# Patient Record
Sex: MALE | Race: BLACK OR AFRICAN AMERICAN | NOT HISPANIC OR LATINO | Employment: FULL TIME | URBAN - METROPOLITAN AREA
[De-identification: names, ages, dates, MRNs, and addresses within clinical notes are randomized per-mention and may not be internally consistent; named-entity substitution may affect disease eponyms.]

---

## 2019-08-27 ENCOUNTER — HOSPITAL ENCOUNTER (EMERGENCY)
Facility: HOSPITAL | Age: 34
Discharge: HOME | End: 2019-08-27
Attending: EMERGENCY MEDICINE

## 2019-08-27 VITALS
WEIGHT: 210 LBS | BODY MASS INDEX: 28.44 KG/M2 | HEIGHT: 72 IN | HEART RATE: 74 BPM | TEMPERATURE: 98 F | OXYGEN SATURATION: 99 % | DIASTOLIC BLOOD PRESSURE: 72 MMHG | SYSTOLIC BLOOD PRESSURE: 122 MMHG | RESPIRATION RATE: 16 BRPM

## 2019-08-27 DIAGNOSIS — M62.82 NON-TRAUMATIC RHABDOMYOLYSIS: Primary | ICD-10-CM

## 2019-08-27 DIAGNOSIS — R53.83 FATIGUE: ICD-10-CM

## 2019-08-27 DIAGNOSIS — R19.7 DIARRHEA, UNSPECIFIED TYPE: ICD-10-CM

## 2019-08-27 DIAGNOSIS — T67.9XXA HEAT EXPOSURE, INITIAL ENCOUNTER: ICD-10-CM

## 2019-08-27 LAB
ALBUMIN SERPL BCP-MCNC: 4.8 G/DL (ref 3.5–5.2)
ALP SERPL-CCNC: 85 U/L (ref 55–135)
ALT SERPL W/O P-5'-P-CCNC: 17 U/L (ref 10–44)
ANION GAP SERPL CALC-SCNC: 11 MMOL/L (ref 8–16)
AST SERPL-CCNC: 27 U/L (ref 10–40)
BASOPHILS # BLD AUTO: 0.03 K/UL (ref 0–0.2)
BASOPHILS NFR BLD: 0.4 % (ref 0–1.9)
BILIRUB SERPL-MCNC: 0.5 MG/DL (ref 0.1–1)
BUN SERPL-MCNC: 13 MG/DL (ref 6–20)
CALCIUM SERPL-MCNC: 10.1 MG/DL (ref 8.7–10.5)
CHLORIDE SERPL-SCNC: 104 MMOL/L (ref 95–110)
CK SERPL-CCNC: 434 U/L (ref 20–200)
CO2 SERPL-SCNC: 21 MMOL/L (ref 23–29)
CREAT SERPL-MCNC: 1.1 MG/DL (ref 0.5–1.4)
DIFFERENTIAL METHOD: ABNORMAL
EOSINOPHIL # BLD AUTO: 0 K/UL (ref 0–0.5)
EOSINOPHIL NFR BLD: 0.5 % (ref 0–8)
ERYTHROCYTE [DISTWIDTH] IN BLOOD BY AUTOMATED COUNT: 14.1 % (ref 11.5–14.5)
EST. GFR  (AFRICAN AMERICAN): >60 ML/MIN/1.73 M^2
EST. GFR  (NON AFRICAN AMERICAN): >60 ML/MIN/1.73 M^2
GLUCOSE SERPL-MCNC: 93 MG/DL (ref 70–110)
HCT VFR BLD AUTO: 41 % (ref 40–54)
HGB BLD-MCNC: 13.8 G/DL (ref 14–18)
IMM GRANULOCYTES # BLD AUTO: 0.02 K/UL (ref 0–0.04)
IMM GRANULOCYTES NFR BLD AUTO: 0.2 % (ref 0–0.5)
LYMPHOCYTES # BLD AUTO: 1.5 K/UL (ref 1–4.8)
LYMPHOCYTES NFR BLD: 18.1 % (ref 18–48)
MCH RBC QN AUTO: 27.2 PG (ref 27–31)
MCHC RBC AUTO-ENTMCNC: 33.7 G/DL (ref 32–36)
MCV RBC AUTO: 81 FL (ref 82–98)
MONOCYTES # BLD AUTO: 0.4 K/UL (ref 0.3–1)
MONOCYTES NFR BLD: 4.3 % (ref 4–15)
NEUTROPHILS # BLD AUTO: 6.5 K/UL (ref 1.8–7.7)
NEUTROPHILS NFR BLD: 76.5 % (ref 38–73)
NRBC BLD-RTO: 0 /100 WBC
PLATELET # BLD AUTO: 250 K/UL (ref 150–350)
PMV BLD AUTO: 9.7 FL (ref 9.2–12.9)
POTASSIUM SERPL-SCNC: 3.9 MMOL/L (ref 3.5–5.1)
PROT SERPL-MCNC: 8 G/DL (ref 6–8.4)
RBC # BLD AUTO: 5.08 M/UL (ref 4.6–6.2)
SODIUM SERPL-SCNC: 136 MMOL/L (ref 136–145)
WBC # BLD AUTO: 8.53 K/UL (ref 3.9–12.7)

## 2019-08-27 PROCEDURE — 80053 COMPREHEN METABOLIC PANEL: CPT

## 2019-08-27 PROCEDURE — 96374 THER/PROPH/DIAG INJ IV PUSH: CPT

## 2019-08-27 PROCEDURE — 99284 EMERGENCY DEPT VISIT MOD MDM: CPT | Mod: 25

## 2019-08-27 PROCEDURE — 63600175 PHARM REV CODE 636 W HCPCS: Performed by: PHYSICIAN ASSISTANT

## 2019-08-27 PROCEDURE — 93010 ELECTROCARDIOGRAM REPORT: CPT | Mod: ,,, | Performed by: INTERNAL MEDICINE

## 2019-08-27 PROCEDURE — 96361 HYDRATE IV INFUSION ADD-ON: CPT

## 2019-08-27 PROCEDURE — 93010 EKG 12-LEAD: ICD-10-PCS | Mod: ,,, | Performed by: INTERNAL MEDICINE

## 2019-08-27 PROCEDURE — 99284 PR EMERGENCY DEPT VISIT,LEVEL IV: ICD-10-PCS | Mod: ,,, | Performed by: PHYSICIAN ASSISTANT

## 2019-08-27 PROCEDURE — 93005 ELECTROCARDIOGRAM TRACING: CPT

## 2019-08-27 PROCEDURE — 85025 COMPLETE CBC W/AUTO DIFF WBC: CPT

## 2019-08-27 PROCEDURE — 82550 ASSAY OF CK (CPK): CPT

## 2019-08-27 PROCEDURE — 99284 EMERGENCY DEPT VISIT MOD MDM: CPT | Mod: ,,, | Performed by: PHYSICIAN ASSISTANT

## 2019-08-27 RX ORDER — ONDANSETRON 2 MG/ML
4 INJECTION INTRAMUSCULAR; INTRAVENOUS
Status: COMPLETED | OUTPATIENT
Start: 2019-08-27 | End: 2019-08-27

## 2019-08-27 RX ADMIN — ONDANSETRON 4 MG: 2 INJECTION INTRAMUSCULAR; INTRAVENOUS at 08:08

## 2019-08-27 RX ADMIN — SODIUM CHLORIDE 1000 ML: 0.9 INJECTION, SOLUTION INTRAVENOUS at 08:08

## 2019-08-28 NOTE — ED TRIAGE NOTES
34 year old male reports feeling lightheaded, dizzy and dehydrated at work around 4 pm. Also complaining of nausea and 7/10 headache. [employer].

## 2019-08-28 NOTE — ED PROVIDER NOTES
Encounter Date: 8/27/2019    SCRIBE #1 NOTE: I, Dolores Ohara, am scribing for, and in the presence of,  Arturo Paredes MD. I have scribed the following portions of the note - the APC attestation.       History     Chief Complaint   Patient presents with    Fatigue     c/o feeling weak and nauseated that started while at work today. States he thinks he got overheated and is dehydrated      The patient presents to the ER c/o generalized weakness, lightheadedness, and fatigue. He states that he thinks that he is dehydrated. He states that he works outside in the heat and has had substantial perspiration all day today in additional to 3 episodes of diarrhea. He states that he has only urinated twice today and his mouth feels dry. He states that he has felt nauseated, but denies any vomiting. He states that he has tried to hydrate by drinking water, but no electrolyte drinks. He denies any pain. He denies any fever or chills. He denies any drug or alcohol use. He denies any CP, SOB, or abdominal pain. No HA. No myalgias or cramps. No known sick contacts.         Review of patient's allergies indicates:  No Known Allergies  Past Medical History:   Diagnosis Date    Marijuana use      History reviewed. No pertinent surgical history.  No family history on file.  Social History     Tobacco Use    Smoking status: Current Every Day Smoker     Packs/day: 0.50     Types: Cigarettes    Smokeless tobacco: Never Used   Substance Use Topics    Alcohol use: Yes     Alcohol/week: 0.0 oz    Drug use: Yes     Types: Marijuana     Review of Systems   Constitutional: Negative for activity change, chills and fever.   HENT: Negative for congestion and sore throat.    Eyes: Negative for visual disturbance.   Respiratory: Negative for cough, chest tightness and shortness of breath.    Cardiovascular: Negative for chest pain, palpitations and leg swelling.   Gastrointestinal: Positive for diarrhea and nausea. Negative for abdominal  distention, abdominal pain, blood in stool, constipation and vomiting.   Genitourinary: Positive for decreased urine volume. Negative for difficulty urinating, dysuria, flank pain, frequency and hematuria.   Musculoskeletal: Negative for arthralgias, back pain, gait problem, myalgias and neck pain.   Skin: Negative for color change and rash.   Allergic/Immunologic: Negative for immunocompromised state.   Neurological: Positive for light-headedness. Negative for tremors, seizures, syncope, speech difficulty, weakness, numbness and headaches.   Psychiatric/Behavioral: Negative for confusion.       Physical Exam     Initial Vitals [08/27/19 2003]   BP Pulse Resp Temp SpO2   117/75 83 16 98.1 °F (36.7 °C) 98 %      MAP       --         Physical Exam    Nursing note and vitals reviewed.  Constitutional: He appears well-developed and well-nourished. He is not diaphoretic.   Alert and ambulatory. Non-toxic appearance.    HENT:   Head: Normocephalic.   Dry oral mucosa.    Eyes: Conjunctivae and EOM are normal. Pupils are equal, round, and reactive to light. No scleral icterus.   Neck: Normal range of motion. Neck supple.   Cardiovascular: Normal rate and intact distal pulses.   Pulmonary/Chest: Breath sounds normal. No respiratory distress.   Abdominal: Soft. He exhibits no distension. There is no tenderness. There is no rebound and no guarding.   Benign abdomen.    Musculoskeletal: Normal range of motion. He exhibits no edema or tenderness.   Neurological: He is alert and oriented to person, place, and time. He has normal strength. No sensory deficit. GCS score is 15. GCS eye subscore is 4. GCS verbal subscore is 5. GCS motor subscore is 6.   Normal speech. Normal gait. No focal deficit. 5/5 strength.    Skin: Skin is warm and dry. No rash noted.   Psychiatric: He has a normal mood and affect. His behavior is normal.         ED Course   Procedures  Labs Reviewed   CBC W/ AUTO DIFFERENTIAL - Abnormal; Notable for the  following components:       Result Value    Hemoglobin 13.8 (*)     Mean Corpuscular Volume 81 (*)     Gran% 76.5 (*)     All other components within normal limits   COMPREHENSIVE METABOLIC PANEL - Abnormal; Notable for the following components:    CO2 21 (*)     All other components within normal limits   CK - Abnormal; Notable for the following components:     (*)     All other components within normal limits     Results for orders placed or performed during the hospital encounter of 08/27/19   CBC auto differential   Result Value Ref Range    WBC 8.53 3.90 - 12.70 K/uL    RBC 5.08 4.60 - 6.20 M/uL    Hemoglobin 13.8 (L) 14.0 - 18.0 g/dL    Hematocrit 41.0 40.0 - 54.0 %    Mean Corpuscular Volume 81 (L) 82 - 98 fL    Mean Corpuscular Hemoglobin 27.2 27.0 - 31.0 pg    Mean Corpuscular Hemoglobin Conc 33.7 32.0 - 36.0 g/dL    RDW 14.1 11.5 - 14.5 %    Platelets 250 150 - 350 K/uL    MPV 9.7 9.2 - 12.9 fL    Immature Granulocytes 0.2 0.0 - 0.5 %    Gran # (ANC) 6.5 1.8 - 7.7 K/uL    Immature Grans (Abs) 0.02 0.00 - 0.04 K/uL    Lymph # 1.5 1.0 - 4.8 K/uL    Mono # 0.4 0.3 - 1.0 K/uL    Eos # 0.0 0.0 - 0.5 K/uL    Baso # 0.03 0.00 - 0.20 K/uL    nRBC 0 0 /100 WBC    Gran% 76.5 (H) 38.0 - 73.0 %    Lymph% 18.1 18.0 - 48.0 %    Mono% 4.3 4.0 - 15.0 %    Eosinophil% 0.5 0.0 - 8.0 %    Basophil% 0.4 0.0 - 1.9 %    Differential Method Automated    Comprehensive metabolic panel   Result Value Ref Range    Sodium 136 136 - 145 mmol/L    Potassium 3.9 3.5 - 5.1 mmol/L    Chloride 104 95 - 110 mmol/L    CO2 21 (L) 23 - 29 mmol/L    Glucose 93 70 - 110 mg/dL    BUN, Bld 13 6 - 20 mg/dL    Creatinine 1.1 0.5 - 1.4 mg/dL    Calcium 10.1 8.7 - 10.5 mg/dL    Total Protein 8.0 6.0 - 8.4 g/dL    Albumin 4.8 3.5 - 5.2 g/dL    Total Bilirubin 0.5 0.1 - 1.0 mg/dL    Alkaline Phosphatase 85 55 - 135 U/L    AST 27 10 - 40 U/L    ALT 17 10 - 44 U/L    Anion Gap 11 8 - 16 mmol/L    eGFR if African American >60.0 >60 mL/min/1.73 m^2     eGFR if non African American >60.0 >60 mL/min/1.73 m^2   CPK   Result Value Ref Range     (H) 20 - 200 U/L            Imaging Results    None          Medical Decision Making:   Initial Assessment:   Pt reports having fatigue and generalized weakness that began this afternoon, reports that he believes he may be dehydrated working in extreme outdoor heat all day today in addition to acute diarrhea.   Differential Diagnosis:   Dehydration, Electrolyte abnormality, MINGO, Rhabdomyolysis, Dysrhythmia, Volume depletion, Orthostatic, Infection, etc   Clinical Tests:   Lab Tests: Ordered and Reviewed  Medical Tests: Ordered and Reviewed  ED Management:  I discussed the case in detail with the ER attending physician   Normal electrolytes and renal function   Mild elevation of CPK   IV fluids given in the ER   Pt reports feeling better after fluids, eager to go home, no diarrhea while in ED  Pt advised to rest and hydrate   Pt instructed to see primary care in the next 1-2 days for re-check   Pt advised to return to the ER promptly if worse in any way or if unimproved     Other:   I have discussed this case with another health care provider.            Scribe Attestation:   Scribe #1: I performed the above scribed service and the documentation accurately describes the services I performed. I attest to the accuracy of the note.    Attending Attestation:     Physician Attestation Statement for NP/PA:   I discussed this assessment and plan of this patient with the NP/PA, but I did not personally examine the patient. The face to face encounter was performed by the NP/PA.                     Clinical Impression:       ICD-10-CM ICD-9-CM   1. Non-traumatic rhabdomyolysis M62.82 728.88   2. Fatigue R53.83 780.79   3. Diarrhea, unspecified type R19.7 787.91   4. Heat exposure, initial encounter T67.9XXA 992.9         Disposition:   Disposition: Discharged  Condition: Stable                        Antwon Becker  ISAK  08/27/19 [id number]

## 2021-07-20 ENCOUNTER — LAB VISIT (OUTPATIENT)
Dept: PRIMARY CARE CLINIC | Facility: OTHER | Age: 36
End: 2021-07-20
Payer: MEDICAID

## 2021-07-20 DIAGNOSIS — Z20.822 ENCOUNTER FOR LABORATORY TESTING FOR COVID-19 VIRUS: ICD-10-CM

## 2021-07-20 PROCEDURE — U0003 INFECTIOUS AGENT DETECTION BY NUCLEIC ACID (DNA OR RNA); SEVERE ACUTE RESPIRATORY SYNDROME CORONAVIRUS 2 (SARS-COV-2) (CORONAVIRUS DISEASE [COVID-19]), AMPLIFIED PROBE TECHNIQUE, MAKING USE OF HIGH THROUGHPUT TECHNOLOGIES AS DESCRIBED BY CMS-2020-01-R: HCPCS | Performed by: INTERNAL MEDICINE

## 2021-07-22 LAB
SARS-COV-2 RNA RESP QL NAA+PROBE: NOT DETECTED
SARS-COV-2- CYCLE NUMBER: -1

## 2023-01-05 ENCOUNTER — OFFICE VISIT (OUTPATIENT)
Dept: PRIMARY CARE CLINIC | Facility: CLINIC | Age: 38
End: 2023-01-05
Payer: MEDICAID

## 2023-01-05 DIAGNOSIS — Z87.828 HISTORY OF GUNSHOT WOUND: Primary | ICD-10-CM

## 2023-01-05 DIAGNOSIS — B00.9 HSV INFECTION: ICD-10-CM

## 2023-01-05 DIAGNOSIS — G89.21 CHRONIC PAIN DUE TO TRAUMA: ICD-10-CM

## 2023-01-05 PROCEDURE — 99203 PR OFFICE/OUTPT VISIT, NEW, LEVL III, 30-44 MIN: ICD-10-PCS | Mod: 95,,, | Performed by: STUDENT IN AN ORGANIZED HEALTH CARE EDUCATION/TRAINING PROGRAM

## 2023-01-05 PROCEDURE — 99203 OFFICE O/P NEW LOW 30 MIN: CPT | Mod: 95,,, | Performed by: STUDENT IN AN ORGANIZED HEALTH CARE EDUCATION/TRAINING PROGRAM

## 2023-01-05 RX ORDER — VALACYCLOVIR HYDROCHLORIDE 1 G/1
TABLET, FILM COATED ORAL
Qty: 60 TABLET | Refills: 11 | Status: SHIPPED | OUTPATIENT
Start: 2023-01-05 | End: 2023-02-03

## 2023-01-05 RX ORDER — TRAMADOL HYDROCHLORIDE 50 MG/1
TABLET ORAL
COMMUNITY
Start: 2023-01-04 | End: 2023-01-05 | Stop reason: SDUPTHER

## 2023-01-05 RX ORDER — METHOCARBAMOL 750 MG/1
750 TABLET, FILM COATED ORAL 2 TIMES DAILY PRN
COMMUNITY
Start: 2022-12-02 | End: 2023-01-05 | Stop reason: SDUPTHER

## 2023-01-05 RX ORDER — METHOCARBAMOL 750 MG/1
750 TABLET, FILM COATED ORAL 2 TIMES DAILY PRN
Qty: 30 TABLET | Refills: 0 | Status: SHIPPED | OUTPATIENT
Start: 2023-01-05

## 2023-01-05 RX ORDER — TRAMADOL HYDROCHLORIDE 50 MG/1
50 TABLET ORAL EVERY 8 HOURS PRN
Qty: 21 TABLET | Refills: 0 | Status: SHIPPED | OUTPATIENT
Start: 2023-01-05 | End: 2023-02-03

## 2023-01-05 RX ORDER — NAPROXEN 500 MG/1
500 TABLET ORAL EVERY 12 HOURS
COMMUNITY
Start: 2022-12-02 | End: 2023-02-03
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
## 2023-01-07 ENCOUNTER — HOSPITAL ENCOUNTER (EMERGENCY)
Facility: HOSPITAL | Age: 38
Discharge: LEFT BEFORE DISCHARGE | End: 2023-01-07
Attending: EMERGENCY MEDICINE
Payer: MEDICAID

## 2023-01-07 VITALS
OXYGEN SATURATION: 96 % | DIASTOLIC BLOOD PRESSURE: 69 MMHG | RESPIRATION RATE: 18 BRPM | TEMPERATURE: 98 F | SYSTOLIC BLOOD PRESSURE: 129 MMHG | HEART RATE: 74 BPM

## 2023-01-07 DIAGNOSIS — R06.6 HICCUPS: ICD-10-CM

## 2023-01-07 DIAGNOSIS — R05.9 COUGH, UNSPECIFIED TYPE: Primary | ICD-10-CM

## 2023-01-07 LAB
INFLUENZA A, MOLECULAR: NOT DETECTED
INFLUENZA B, MOLECULAR: NOT DETECTED
RSV AG BY MOLECULAR METHOD: NOT DETECTED
SARS-COV-2 RNA RESP QL NAA+PROBE: NOT DETECTED

## 2023-01-07 PROCEDURE — 99284 EMERGENCY DEPT VISIT MOD MDM: CPT | Mod: CS,,, | Performed by: PHYSICIAN ASSISTANT

## 2023-01-07 PROCEDURE — 93010 EKG 12-LEAD: ICD-10-PCS | Mod: ,,, | Performed by: INTERNAL MEDICINE

## 2023-01-07 PROCEDURE — 99284 EMERGENCY DEPT VISIT MOD MDM: CPT

## 2023-01-07 PROCEDURE — 93005 ELECTROCARDIOGRAM TRACING: CPT

## 2023-01-07 PROCEDURE — 93010 ELECTROCARDIOGRAM REPORT: CPT | Mod: ,,, | Performed by: INTERNAL MEDICINE

## 2023-01-07 PROCEDURE — 0241U SARS-COV2 (COVID) WITH FLU/RSV BY PCR: CPT | Performed by: PHYSICIAN ASSISTANT

## 2023-01-07 PROCEDURE — 99284 PR EMERGENCY DEPT VISIT,LEVEL IV: ICD-10-PCS | Mod: CS,,, | Performed by: PHYSICIAN ASSISTANT

## 2023-01-07 RX ORDER — MAG HYDROX/ALUMINUM HYD/SIMETH 200-200-20
30 SUSPENSION, ORAL (FINAL DOSE FORM) ORAL EVERY 4 HOURS PRN
Qty: 769 ML | Refills: 0 | Status: SHIPPED | OUTPATIENT
Start: 2023-01-07 | End: 2023-02-03

## 2023-01-07 RX ORDER — ONDANSETRON 4 MG/1
4 TABLET, ORALLY DISINTEGRATING ORAL EVERY 6 HOURS PRN
Qty: 15 TABLET | Refills: 0 | Status: SHIPPED | OUTPATIENT
Start: 2023-01-07 | End: 2023-02-03

## 2023-01-07 RX ORDER — METOCLOPRAMIDE HYDROCHLORIDE 5 MG/ML
10 INJECTION INTRAMUSCULAR; INTRAVENOUS
Status: DISCONTINUED | OUTPATIENT
Start: 2023-01-07 | End: 2023-01-07

## 2023-01-07 RX ORDER — BENZONATATE 100 MG/1
100 CAPSULE ORAL 3 TIMES DAILY PRN
Qty: 20 CAPSULE | Refills: 0 | Status: SHIPPED | OUTPATIENT
Start: 2023-01-07 | End: 2023-01-17

## 2023-01-07 RX ORDER — BENZONATATE 100 MG/1
100 CAPSULE ORAL
Status: DISCONTINUED | OUTPATIENT
Start: 2023-01-07 | End: 2023-01-07 | Stop reason: HOSPADM

## 2023-01-07 RX ORDER — MAG HYDROX/ALUMINUM HYD/SIMETH 200-200-20
30 SUSPENSION, ORAL (FINAL DOSE FORM) ORAL
Status: DISCONTINUED | OUTPATIENT
Start: 2023-01-07 | End: 2023-01-07 | Stop reason: HOSPADM

## 2023-01-07 NOTE — ED TRIAGE NOTES
Hiccups with nausea since last night.  Pt also with cough.  Pt would like meds for both.        LOC: The patient is awake, alert and aware of environment with an appropriate affect, the patient is oriented x 3 and speaking appropriately.  APPEARANCE: Patient resting comfortably and in no acute distress, patient is clean and well groomed, patient's clothing is properly fastened.  SKIN: The skin is warm and dry, color consistent with ethnicity, patient has normal skin turgor and moist mucus membranes, skin intact, no breakdown or bruising noted.  MUSCULOSKELETAL: Patient moving all extremities spontaneously, no obvious swelling or deformities noted.  RESPIRATORY: Airway is open and patent, respirations are spontaneous, patient has a normal effort and rate, no accessory muscle use noted.  ABDOMEN: Soft and non tender to palpation, no distention noted.

## 2023-01-07 NOTE — ED NOTES
Patient not answering calls, has not been seen around or on the unit, and has not returned to room. MD made aware.

## 2023-02-03 ENCOUNTER — OFFICE VISIT (OUTPATIENT)
Dept: PRIMARY CARE CLINIC | Facility: CLINIC | Age: 38
End: 2023-02-03
Payer: MEDICAID

## 2023-02-03 DIAGNOSIS — G89.21 CHRONIC PAIN DUE TO TRAUMA: Primary | ICD-10-CM

## 2023-02-03 PROCEDURE — 99214 PR OFFICE/OUTPT VISIT, EST, LEVL IV, 30-39 MIN: ICD-10-PCS | Mod: 95,,, | Performed by: INTERNAL MEDICINE

## 2023-02-03 PROCEDURE — 99214 OFFICE O/P EST MOD 30 MIN: CPT | Mod: 95,,, | Performed by: INTERNAL MEDICINE

## 2023-02-03 RX ORDER — TRAMADOL HYDROCHLORIDE 50 MG/1
50 TABLET ORAL EVERY 8 HOURS PRN
Qty: 28 TABLET | Refills: 0 | Status: SHIPPED | OUTPATIENT
Start: 2023-02-03

## 2023-02-03 RX ORDER — PROMETHAZINE HYDROCHLORIDE 6.25 MG/5ML
12.5 SYRUP ORAL EVERY 8 HOURS PRN
Qty: 120 ML | Refills: 0 | Status: SHIPPED | OUTPATIENT
Start: 2023-02-03

## 2023-02-03 NOTE — ADDENDUM NOTE
Addended by: ALEISHA MACDONALD on: 2/3/2023 10:51 AM     Modules accepted: Orders, Level of Service
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
## 2023-02-03 NOTE — PROGRESS NOTES
Pt left virtual visit without being seen.   Attempted to call to troubleshoot connection: no answer.

## 2023-03-30 DIAGNOSIS — G89.21 CHRONIC PAIN DUE TO TRAUMA: ICD-10-CM

## 2023-04-04 RX ORDER — TRAMADOL HYDROCHLORIDE 50 MG/1
50 TABLET ORAL EVERY 8 HOURS PRN
Qty: 28 TABLET | Refills: 0 | OUTPATIENT
Start: 2023-04-04

## 2023-04-04 RX ORDER — PROMETHAZINE HYDROCHLORIDE 6.25 MG/5ML
12.5 SYRUP ORAL EVERY 8 HOURS PRN
Qty: 120 ML | Refills: 0 | OUTPATIENT
Start: 2023-04-04

## 2024-07-02 ENCOUNTER — ON-DEMAND VIRTUAL (OUTPATIENT)
Dept: URGENT CARE | Facility: CLINIC | Age: 39
End: 2024-07-02
Payer: MEDICAID

## 2024-07-02 DIAGNOSIS — G89.29 OTHER CHRONIC PAIN: Primary | ICD-10-CM

## 2024-07-02 PROCEDURE — 99499 UNLISTED E&M SERVICE: CPT | Mod: 95,,, | Performed by: PHYSICIAN ASSISTANT

## 2024-07-02 NOTE — PROGRESS NOTES
Patient requesting refills of his tramadol, robaxin and promethazine for chronic pain. States he just recently got out of care home and wants to restart his regular medicines. Unable to refill on this platform due to policy. Recommend follow up in person with PCP or local urgent care for further evaluation and management. Pt VU and agreement with plan.

## 2024-08-26 ENCOUNTER — ON-DEMAND VIRTUAL (OUTPATIENT)
Dept: URGENT CARE | Facility: CLINIC | Age: 39
End: 2024-08-26
Payer: MEDICAID

## 2024-08-26 DIAGNOSIS — G89.29 OTHER CHRONIC PAIN: Primary | ICD-10-CM

## 2024-08-26 PROCEDURE — 99499 UNLISTED E&M SERVICE: CPT | Mod: 95,,, | Performed by: NURSE PRACTITIONER

## 2024-08-26 NOTE — PROGRESS NOTES
Subjective:      Patient ID: Mika Wilson is a 39 y.o. male.    Vitals:  vitals were not taken for this visit.     Chief Complaint: Medication Refill      Visit Type: TELE AUDIOVISUAL    Present with the patient at the time of consultation: TELEMED PRESENT WITH PATIENT: None        Past Medical History:   Diagnosis Date    Marijuana use      History reviewed. No pertinent surgical history.  Review of patient's allergies indicates:  No Known Allergies  Current Outpatient Medications on File Prior to Visit   Medication Sig Dispense Refill    methocarbamoL (ROBAXIN) 750 MG Tab Take 1 tablet (750 mg total) by mouth 2 (two) times daily as needed (muscle spasms). 30 tablet 0    promethazine (PHENERGAN) 6.25 mg/5 mL syrup Take 10 mLs (12.5 mg total) by mouth every 8 (eight) hours as needed for Nausea. 120 mL 0    traMADoL (ULTRAM) 50 mg tablet Take 1 tablet (50 mg total) by mouth every 8 (eight) hours as needed for Pain. 28 tablet 0     No current facility-administered medications on file prior to visit.     No family history on file.        [facility] Peq Odvv Intake    8/26/2024  3:08 PM CDT - Filed by Patient   What is your current physical address in the event of a medical emergency? [address], [address]   Are you able to take your vital signs? No   Please attach any relevant images or files          Patient requesting refill of tramadol and viagra    Medication Refill        Constitution: Negative.   HENT: Negative.     Cardiovascular: Negative.    Eyes: Negative.    Respiratory: Negative.     Gastrointestinal: Negative.  Negative for bowel incontinence.   Endocrine: negative.   Genitourinary: Negative.  Negative for dysuria, flank pain, bladder incontinence and pelvic pain.   Musculoskeletal: Negative.  Negative for pain, abnormal ROM of joint and back pain.   Skin: Negative.    Allergic/Immunologic: Negative.    Neurological: Negative.    Hematologic/Lymphatic: Negative.    Psychiatric/Behavioral: Negative.           Objective:   The physical exam was conducted virtually.  LOCATION OF PATIENT home  Physical Exam   Constitutional: He is oriented to person, place, and time. He appears well-developed.   HENT:   Head: Normocephalic and atraumatic.   Ears:   Right Ear: Hearing, tympanic membrane and external ear normal.   Left Ear: Hearing, tympanic membrane and external ear normal.   Nose: Nose normal.   Mouth/Throat: Uvula is midline, oropharynx is clear and moist and mucous membranes are normal.   Eyes: Conjunctivae and EOM are normal. Pupils are equal, round, and reactive to light.   Neck: Neck supple.   Cardiovascular: Normal rate.   Pulmonary/Chest: Effort normal and breath sounds normal.   Musculoskeletal: Normal range of motion.         General: Normal range of motion.   Neurological: He is alert and oriented to person, place, and time.   Skin: Skin is warm.   Psychiatric: His behavior is normal. Thought content normal.   Nursing note and vitals reviewed.      Assessment:     1. Other chronic pain        Plan:     Follow up with your primary care provider if symptoms persist.  Go to the Emergency room for worsening of symptoms.     Other chronic pain

## 2024-09-30 ENCOUNTER — PATIENT OUTREACH (OUTPATIENT)
Dept: ADMINISTRATIVE | Facility: OTHER | Age: 39
End: 2024-09-30
Payer: MEDICAID

## 2024-09-30 NOTE — PROGRESS NOTES
CHW - Initial Contact    This Community Health Worker completed the Social Determinant of Health questionnaire with patient via telephone today.    Pt identified barriers of most importance are: No barriers reported.   Referrals to community agencies completed with patient/caregiver consent outside of [facility] include: No.  Referrals were put through [facility] - no:   Support and Services: No.  Other information discussed the patient needs / wants help with: SDOH completed. Patient did not report any barriers at this time, case will closed.   Follow up required: No.

## 2025-06-27 ENCOUNTER — HOSPITAL ENCOUNTER (EMERGENCY)
Facility: OTHER | Age: 40
Discharge: HOME | End: 2025-06-27
Attending: EMERGENCY MEDICINE
Payer: MEDICAID

## 2025-06-27 VITALS
OXYGEN SATURATION: 99 % | BODY MASS INDEX: 29.8 KG/M2 | TEMPERATURE: 99 F | SYSTOLIC BLOOD PRESSURE: 116 MMHG | HEART RATE: 70 BPM | WEIGHT: 220 LBS | RESPIRATION RATE: 18 BRPM | DIASTOLIC BLOOD PRESSURE: 68 MMHG | HEIGHT: 72 IN

## 2025-06-27 DIAGNOSIS — S99.921A RIGHT FOOT INJURY, INITIAL ENCOUNTER: ICD-10-CM

## 2025-06-27 DIAGNOSIS — M25.571 ACUTE RIGHT ANKLE PAIN: ICD-10-CM

## 2025-06-27 DIAGNOSIS — M79.671 RIGHT FOOT PAIN: Primary | ICD-10-CM

## 2025-06-27 DIAGNOSIS — S99.911A RIGHT ANKLE INJURY, INITIAL ENCOUNTER: ICD-10-CM

## 2025-06-27 PROCEDURE — 99283 EMERGENCY DEPT VISIT LOW MDM: CPT | Mod: 25

## 2025-06-27 RX ORDER — ACETAMINOPHEN 325 MG/1
650 TABLET ORAL
Status: DISCONTINUED | OUTPATIENT
Start: 2025-06-27 | End: 2025-06-28 | Stop reason: HOSPADM
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
## 2025-06-28 NOTE — DISCHARGE INSTRUCTIONS
Rest: avoid unnecessary pressure and limit use of your injury as much as possible.  Ice: apply cold compresses intermittently as needed.    Compression: use a brace or ACE bandage as needed.  Elevation: keep your injury elevated to help with pain and swelling.  As your pain improves, begin normal activities slowly as tolerated.    Follow-up with your primary care provider as soon as possible. If symptoms persist, you may benefit from seeing an Orthopedist.       Thank you for choosing [facility]!     Our goal in the Emergency Department is to always provide outstanding medical care. You may receive a survey by mail or e-mail in the next week regarding your experience today. We would greatly appreciate you completing and returning the survey. Your feedback provides us with a way to recognize our staff who provide very good care, and it helps us learn how to improve when your experience was below our aspiration of excellence.      It is important to remember that some problems are difficult to diagnose and may not be found during your first visit. Be sure to follow up with your primary care doctor and review any labs/imaging that was performed during your visit with them. If you do not have a primary care doctor, you may contact the one listed on your discharge paperwork, or you may also call the [facility] Appointment Desk at [phone] to schedule an appointment.     All medications may potentially have side effects and it is impossible to predict which medications may give you side effects. If you feel that you are having a negative effect of any medication you should immediately stop taking them and seek medical attention.  Do not drive or make any important decisions for 24 hours if you have received any pain medications, sedatives or mood altering drugs during your ER visit.    We appreciate you trusting us with your medical care. We will be happy to take care of you for all of your future  medical needs. You may return to the ER at any time for any new/concerning symptoms, worsening condition, or failure to improve. We hope you feel better soon.     Sincerely,    Prudencio Pandya Jr., MD  Board-Certified Emergency Medicine Physician  [facility]
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
